# Patient Record
Sex: MALE | Race: AMERICAN INDIAN OR ALASKA NATIVE
[De-identification: names, ages, dates, MRNs, and addresses within clinical notes are randomized per-mention and may not be internally consistent; named-entity substitution may affect disease eponyms.]

---

## 2018-08-21 ENCOUNTER — HOSPITAL ENCOUNTER (EMERGENCY)
Dept: HOSPITAL 42 - ED | Age: 43
Discharge: HOME | End: 2018-08-21
Payer: MEDICAID

## 2018-08-21 VITALS — SYSTOLIC BLOOD PRESSURE: 169 MMHG | DIASTOLIC BLOOD PRESSURE: 95 MMHG | HEART RATE: 78 BPM

## 2018-08-21 VITALS — TEMPERATURE: 98.9 F | OXYGEN SATURATION: 98 % | RESPIRATION RATE: 18 BRPM

## 2018-08-21 DIAGNOSIS — S92.511A: Primary | ICD-10-CM
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
## 2018-08-21 NOTE — ED PDOC
Arrival/HPI





- General


Chief Complaint: Lower Extremity Problem/Injury


Time Seen by Provider: 08/21/18 08:40


Historian: Patient





- History of Present Illness


Narrative History of Present Illness (Text): 





08/21/18 09:36


42yo male with  pmhx of hypertension who present with complaint of right 4th 

toe pain. States he "stubbed" the toe against the floor last night. Notes pain 

with ambulation. Did not take any analgesic. Denies any other complaint.











Past Medical History





- Provider Review


Nursing Documentation Reviewed: Yes





- Infectious Disease


Hx of Infectious Diseases: None





- Cardiac


Hx Cardiac Disorders: Yes


Hx Hypertension: Yes





- Pulmonary


Hx Respiratory Disorders: No





- Neurological


Hx Neurological Disorder: No





- Renal


Hx Renal Disorder: No





- Endocrine/Metabolic


Hx Endocrine Disorders: No





- Hematological/Oncological


Hx Blood Disorders: No





- Integumentary


Hx Dermatological Disorder: No





- Musculoskeletal/Rheumatological


Hx Musculoskeletal Disorders: No





- Psychiatric


Hx Psychophysiologic Disorder: No


Hx Substance Use: No





- Anesthesia


Hx Anesthesia: No





Family/Social History





- Physician Review


Nursing Documentation Reviewed: Yes


Family/Social History: Unknown Family HX


Smoking Status: Unknown If Ever Smoked


Hx Alcohol Use: No


Hx Substance Use: No





Allergies/Home Meds


Allergies/Adverse Reactions: 


Allergies





No Known Allergies Allergy (Verified 08/21/18 09:01)


 











Review of Systems





- Physician Review


All systems were reviewed & negative as marked: Yes





- Review of Systems


Constitutional: Normal


Eyes: Normal


ENT: Normal


Respiratory: Normal


Cardiovascular: Normal


Gastrointestinal: Normal


Genitourinary Male: Normal


Musculoskeletal: Arthralgias (Right 4th toe)


Skin: Normal


Neurological: Normal


Endocrine: Normal


Hemo/Lymphatic: Normal


Psychiatric: Normal





Physical Exam


Vital Signs Reviewed: Yes


Vital Signs











  Temp Pulse Resp BP Pulse Ox


 


 08/21/18 10:34      98


 


 08/21/18 10:33   78  18  169/95 H  98


 


 08/21/18 08:57  98.9 F  80  18  171/106 H  98











Temperature: Afebrile


Blood Pressure: Normal


Pulse: Regular


Respiratory Rate: Normal


Appearance: Positive for: Well-Appearing, Non-Toxic, Comfortable


Pain Distress: None


Mental Status: Positive for: Alert and Oriented X 3





- Systems Exam


Head: Present: Atraumatic, Normocephalic


Pupils: Present: PERRL


Extroacular Muscles: Present: EOMI


Conjunctiva: Present: Normal


Mouth: Present: Moist Mucous Membranes


Neck: Present: Normal Range of Motion


Respiratory/Chest: Present: Clear to Auscultation, Good Air Exchange.  No: 

Respiratory Distress, Accessory Muscle Use


Cardiovascular: Present: Regular Rate and Rhythm, Normal S1, S2.  No: Murmurs


Abdomen: No: Tenderness, Distention, Peritoneal Signs


Back: Present: Normal Inspection


Upper Extremity: Present: Normal Inspection.  No: Cyanosis, Edema


Lower Extremity: Present: NORMAL PULSES, Normal ROM, Tenderness (Right 4th toe)

, Neurovascularly Intact.  No: Edema, Swelling


Neurological: Present: GCS=15, CN II-XII Intact, Speech Normal


Skin: Present: Warm, Dry, Normal Color.  No: Rashes


Psychiatric: Present: Alert, Oriented x 3, Normal Insight, Normal Concentration





Medical Decision Making


ED Course and Treatment: 





08/21/18 10:58


Right foot xray - 4tt proximal toe fracture





Toes gustabo taped. Ortho shoe given. Cane given


Pt referred to a podiatrist


Ibuprofen 600mg given for pain control.





- RAD Interpretation


Radiology Orders: 








08/21/18 09:05


FOOT RIGHT 4TH DIGIT (TOE) [RAD] Stat 














- Medication Orders


Current Medication Orders: 











Discontinued Medications





Ibuprofen (Motrin Tab)  600 mg PO STAT STA


   Stop: 08/21/18 09:07


   Last Admin: 08/21/18 09:28  Dose: 600 mg





MAR Pain/Vitals


 Document     08/21/18 09:28  SF  (Rec: 08/21/18 09:28  SF  [id number])


     Pain Reassessment


      Is This A Pain ReAssessment?               Yes


     Sleep


      Is patient sleeping during reassessment?   No


     Presence of Pain


      Presence of Pain                           Yes














Disposition/Present on Arrival





- Present on Arrival


Any Indicators Present on Arrival: No


History of DVT/PE: No


History of Uncontrolled Diabetes: No


Urinary Catheter: No


History of Decub. Ulcer: No


History Surgical Site Infection Following: None





- Disposition


Have Diagnosis and Disposition been Completed?: Yes


Diagnosis: 


 Toe fracture





Disposition: HOME/ ROUTINE


Disposition Time: 10:10


Patient Plan: Discharge


Patient Problems: 


 Current Active Problems











Problem Status Onset


 


Toe fracture Acute  











Condition: STABLE


Discharge Instructions (ExitCare):  Toe Fracture (DC)


Additional Instructions: 


Follow up with a Podiatrist


Return to ED for any new or worsening symptoms


Prescriptions: 


Ibuprofen [Motrin Tab] 600 mg PO Q6 #15 tab


Referrals: 


Itz Gunn MD [Primary Care Provider] - Follow up with primary


Eliecer Hernandez DPM [Staff Provider] - Follow up with primary


Forms:  [employer] (English), WORK NOTE

## 2018-08-21 NOTE — RAD
PROCEDURE:  Radiographs of the right 4th digit.



TECHNIQUE::  AP radiograph of the right foot, with oblique and 

lateral view of the right 4th digit.



COMPARISON:

None.



FINDINGS:



BONES:

There is an acute transverse impacted mildly posteriorly and medially 

displaced fracture in the base of the proximal phalanx of the 4th 

digit. Bone alignment and mineralization are normal. 



JOINTS:

Normal. 



SOFT TISSUES:

Normal. 



OTHER FINDINGS:

None.



IMPRESSION:

Acute transverse impacted mildly posteriorly and medially displaced 

fracture in the base of the proximal phalanx of the 4th digit.